# Patient Record
Sex: FEMALE | Race: WHITE | HISPANIC OR LATINO | URBAN - METROPOLITAN AREA
[De-identification: names, ages, dates, MRNs, and addresses within clinical notes are randomized per-mention and may not be internally consistent; named-entity substitution may affect disease eponyms.]

---

## 2022-07-12 DIAGNOSIS — Z34.01 ENCOUNTER FOR SUPERVISION OF NORMAL FIRST PREGNANCY IN FIRST TRIMESTER: Primary | ICD-10-CM

## 2022-07-22 ENCOUNTER — PROCEDURE VISIT (OUTPATIENT)
Dept: OBSTETRICS AND GYNECOLOGY | Facility: CLINIC | Age: 22
End: 2022-07-22
Payer: MEDICAID

## 2022-07-22 DIAGNOSIS — Z34.01 ENCOUNTER FOR SUPERVISION OF NORMAL FIRST PREGNANCY IN FIRST TRIMESTER: ICD-10-CM

## 2022-07-25 LAB
AMPHETAMINES (500): NEGATIVE NG/ML
BARBITURATES (200): NEGATIVE NG/ML
BENZODIAZEPINES: NEGATIVE NG/ML
COCAINE (150): NEGATIVE NG/ML
MARIJUANA, THC (50): NEGATIVE NG/ML
METHADONE: NEGATIVE NG/ML
OPIATES: NEGATIVE NG/ML
OXYCODONE: NEGATIVE NG/ML
PH: 7.4 (ref 4.5–8)
PHENCYCLIDINE (25): NEGATIVE NG/ML
URINE CREATININE D/S: 144.2 MG/DL

## 2022-08-01 ENCOUNTER — ROUTINE PRENATAL (OUTPATIENT)
Dept: OBSTETRICS AND GYNECOLOGY | Facility: CLINIC | Age: 22
End: 2022-08-01
Payer: MEDICAID

## 2022-08-01 VITALS — DIASTOLIC BLOOD PRESSURE: 68 MMHG | SYSTOLIC BLOOD PRESSURE: 108 MMHG | WEIGHT: 131 LBS | HEART RATE: 67 BPM

## 2022-08-01 DIAGNOSIS — Z34.01 ENCOUNTER FOR SUPERVISION OF NORMAL FIRST PREGNANCY IN FIRST TRIMESTER: Primary | ICD-10-CM

## 2022-08-01 PROCEDURE — 99203 OFFICE O/P NEW LOW 30 MIN: CPT | Mod: TH,S$GLB,, | Performed by: OBSTETRICS & GYNECOLOGY

## 2022-08-01 PROCEDURE — 99203 PR OFFICE/OUTPT VISIT, NEW, LEVL III, 30-44 MIN: ICD-10-PCS | Mod: TH,S$GLB,, | Performed by: OBSTETRICS & GYNECOLOGY

## 2022-08-01 NOTE — PROGRESS NOTES
Subjective:       Patient ID: Amalia Chang is a 21 y.o.  at 11w4d   Chief Complaint:  Initial Prenatal Visit      History of Present Illness  here for new ob exam.  Labs and history were reviewed with the patient today  No complaints      No past medical history on file.    Past Surgical History:   Procedure Laterality Date    WRIST SURGERY         OB:    OB History    Para Term  AB Living   1             SAB IAB Ectopic Multiple Live Births                  # Outcome Date GA Lbr Geoffrey/2nd Weight Sex Delivery Anes PTL Lv   1 Current              Gyn: no STD, never had abn pap   Meds: No current outpatient medications on file.    All: Review of patient's allergies indicates:  No Known Allergies    SH:   Social History     Tobacco Use    Smoking status: Never Smoker    Smokeless tobacco: Never Used   Substance Use Topics    Alcohol use: Not Currently      FH: family history is not on file.      Review of Systems  nml 1st trimester sx- sob, dec excercixe tolerance, fatigue and nausea  Neg for vag bleed, dc, vomiting, cp, lof, fever, chills, ns, visual changes, swelling, headaches, constipation/diarrhea, dysuria, freq/urgency of urination     Objective:     Vitals:    22 1104   BP: 108/68   Pulse: 67   Weight: 59.4 kg (131 lb)       NAD  NCAT  pupils normal size  Skin nml no rashes or lesions  No resp distress, resp even and unlabored  nt nd, no rebound no guarding  nml ext fem gent, normal cvx uterus and adnexa, no dc or bleeding  nml appearing rectum  No cyanosis or clubbing, edema appropriate for pregn    Uterus size approp for gest age         Assessment:        1. Encounter for supervision of normal first pregnancy in first trimester               Plan:      Encounter for supervision of normal first pregnancy in first trimester  -     Urine culture  -     Liquid-based pap smear, screening           Pain fever bleeding precautions  Encouraged PNV  rtc 4 wks

## 2022-08-02 LAB
ABS NRBC COUNT: 0 X 10 3/UL (ref 0–0.01)
ABSOLUTE BASOPHIL: 0.02 X 10 3/UL (ref 0–0.22)
ABSOLUTE EOSINOPHIL: 0.09 X 10 3/UL (ref 0.04–0.54)
ABSOLUTE IMMATURE GRAN: 0.04 X 10 3/UL (ref 0–0.04)
ABSOLUTE LYMPHOCYTE: 1.75 X 10 3/UL (ref 0.86–4.75)
ABSOLUTE MONOCYTE: 0.35 X 10 3/UL (ref 0.22–1.08)
ANTIBODY SCREEN: NEGATIVE
BASOPHILS NFR BLD: 0.3 % (ref 0.2–1.2)
BLOOD GROUPING: NORMAL
BLOOD TYPE (D): POSITIVE
EOSINOPHIL NFR BLD: 1.2 % (ref 0.7–7)
HBV SURFACE AG SERPL QL IA: NONREACTIVE
HCT VFR BLD AUTO: 35.3 % (ref 37–47)
HCV IGG SERPL QL IA: NONREACTIVE
HGB BLD-MCNC: 11.7 G/DL (ref 12–16)
HIV 1+2 AB+HIV1 P24 AG SERPL QL IA: NONREACTIVE
IMMATURE GRANULOCYTES: 0.5 % (ref 0–0.5)
LYMPHOCYTES NFR BLD: 22.5 % (ref 19.3–53.1)
MCH RBC QN AUTO: 30.2 PG (ref 27–32)
MCHC RBC AUTO-ENTMCNC: 33.1 G/DL (ref 32–36)
MCV RBC AUTO: 91 FL (ref 82–100)
MONOCYTES NFR BLD: 4.5 % (ref 4.7–12.5)
NEUTROPHILS # BLD AUTO: 5.54 X 10 3/UL (ref 2.15–7.56)
NEUTROPHILS NFR BLD: 71 % (ref 34–71.1)
NUCLEATED RED BLOOD CELLS: 0 /100 WBC (ref 0–0.2)
PLATELET # BLD AUTO: 241 X 10 3/UL (ref 135–400)
RBC # BLD AUTO: 3.88 X 10 6/UL (ref 4.2–5.4)
RDW-SD: 43.2 FL (ref 37–54)
RUBELLA IGG SCREEN: NORMAL
SICKLE CELL PREP: NEGATIVE
SYPHILIS TREPONEMAL ANTIBODY: NONREACTIVE
WBC # BLD: 7.79 X 10 3/UL (ref 4.3–10.8)

## 2022-08-03 LAB
CHLAMYDIA: NEGATIVE
GONORRHEA: NEGATIVE
Lab: NORMAL
SOURCE: NORMAL
SOURCE: NORMAL
TRICHOMONAS AMPLIFIED: NEGATIVE
URINE CULTURE, ROUTINE: NORMAL

## 2022-08-09 ENCOUNTER — PATIENT MESSAGE (OUTPATIENT)
Dept: OBSTETRICS AND GYNECOLOGY | Facility: CLINIC | Age: 22
End: 2022-08-09
Payer: MEDICAID

## 2022-08-09 ENCOUNTER — NURSE TRIAGE (OUTPATIENT)
Dept: ADMINISTRATIVE | Facility: CLINIC | Age: 22
End: 2022-08-09
Payer: MEDICAID

## 2022-08-09 NOTE — TELEPHONE ENCOUNTER
Called patient, she reports cramping and diarrhea. Denies vaginal bleeding. Patient states she is on the way to the ER. Informed patient if she feels she needs to be evaluated she came continue to go. Informed patient to stay hydrated and take tylenol if needed for the cramping. Verbalized understanding.

## 2022-08-09 NOTE — TELEPHONE ENCOUNTER
Patient transferred from patient access. C/O lower abdominal pain with pain to vagina also. She is 12 weeks and 5 days gestation. Unclear level of pain, states more painful than normal. Rates pain scale of 5/10. Triage done- dispo ED. She has someone that can bring her. Advised I would send message to her provider. Instructed to call back for any further questions or concerns. Verb understanding.      Reason for Disposition   MODERATE-SEVERE abdominal pain    Additional Information   Negative: Passed out (i.e., fainted, collapsed and was not responding)   Negative: Shock suspected (e.g., cold/pale/clammy skin, too weak to stand, low BP, rapid pulse)   Negative: Difficult to awaken or acting confused (e.g., disoriented, slurred speech)   Negative: Sounds like a life-threatening emergency to the triager   Negative: SEVERE vaginal bleeding (e.g., soaking 2 pads per hour, large blood clots) and present 2 or more hours   Negative: MODERATE vaginal bleeding (e.g., soaking 1 pad per hour; clots) and present > 6 hours   Negative: MODERATE vaginal bleeding and pregnant > 12 weeks   Negative: Passed tissue (e.g., gray-white)   Negative: Vomiting and contains red blood or black ('coffee ground') material   Negative: Shoulder pain   Negative: MILD constant abdominal pain (e.g., doesn't interfere with normal activities) and present > 2 hours   Negative: Intermittent lower abdominal pain lasting > 24 hours   Negative: Vaginal bleeding or spotting   Negative: White of the eyes have turned yellow (i.e., jaundice)   Negative: Lightheadedness or dizziness   Negative: Patient sounds very sick or weak to the triager   Negative: Fever > 100.4 F (38.0 C)   Negative: Unusual vaginal discharge (e.g., odorous, yellow, green, or foamy-white)   Negative: Discomfort when passing urine (e.g., pain, burning or stinging)   Negative: Blood in urine (red, pink, or tea-colored)   Negative: Patient wants to be seen   Negative:  Prior history of 'ectopic pregnancy' or previous tubal surgery (e.g., tubal ligation)   Negative: Has IUD   Negative: Not feeling pregnant any longer (e.g., breast tenderness or nausea has disappeared)   Negative: Abdominal pain and pregnant 20 or more weeks    Protocols used: PREGNANCY - ABDOMINAL PAIN LESS THAN 20 WEEKS EGA-A-[place]

## 2022-08-29 ENCOUNTER — ROUTINE PRENATAL (OUTPATIENT)
Dept: OBSTETRICS AND GYNECOLOGY | Facility: CLINIC | Age: 22
End: 2022-08-29
Payer: MEDICAID

## 2022-08-29 VITALS — HEART RATE: 83 BPM | WEIGHT: 133 LBS | DIASTOLIC BLOOD PRESSURE: 72 MMHG | SYSTOLIC BLOOD PRESSURE: 112 MMHG

## 2022-08-29 DIAGNOSIS — Z34.02 ENCOUNTER FOR SUPERVISION OF NORMAL FIRST PREGNANCY IN SECOND TRIMESTER: Primary | ICD-10-CM

## 2022-08-29 PROCEDURE — 99213 PR OFFICE/OUTPT VISIT, EST, LEVL III, 20-29 MIN: ICD-10-PCS | Mod: TH,S$GLB,, | Performed by: OBSTETRICS & GYNECOLOGY

## 2022-08-29 PROCEDURE — 99213 OFFICE O/P EST LOW 20 MIN: CPT | Mod: TH,S$GLB,, | Performed by: OBSTETRICS & GYNECOLOGY

## 2022-08-29 NOTE — PROGRESS NOTES
Subjective:       Patient ID: Amalia Chang is a 21 y.o.  at 15w4d      Chief Complaint:  No chief complaint on file.      History of Present Illness  No complaints. Reports normal sx. Labs and history reviewed with pt.       Review of Systems  Denies n/v, f/c, dysuria, contractions,   VD, VB, round ligament pain, headaches       Objective:   There were no vitals filed for this visit.  Wt Readings from Last 3 Encounters:   22 59.4 kg (131 lb)        nad  NCAT  pupils normal size  Skin nml no rashes or lesions  No resp distress, resp even and unlabored  Gravid nt, no rebound no guarding  No cyanosis or clubbing, edema appropriate for pregn    FHT: 150's       Assessment:        1. Encounter for supervision of normal first pregnancy in second trimester                Plan:        Prenatal testing -   Encouraged PNV  Pain, fever, bleeding precautions   RTC 4 weeks

## 2022-09-20 ENCOUNTER — NURSE TRIAGE (OUTPATIENT)
Dept: ADMINISTRATIVE | Facility: CLINIC | Age: 22
End: 2022-09-20
Payer: MEDICAID

## 2022-09-20 ENCOUNTER — PATIENT MESSAGE (OUTPATIENT)
Dept: OBSTETRICS AND GYNECOLOGY | Facility: CLINIC | Age: 22
End: 2022-09-20
Payer: MEDICAID

## 2022-09-20 NOTE — TELEPHONE ENCOUNTER
Having CP past 3-4 hours, feels like weighted, pressure like pain 4/10 and has been constant. Also 19 weeks pregnant. Denies abd pain, vaginal bleeding or leakage of fluid. Advised per protocol. VU  Reason for Disposition   [1] Chest pain lasts > 5 minutes AND [2] described as crushing, pressure-like, or heavy    Additional Information   Negative: SEVERE difficulty breathing (e.g., struggling for each breath, speaks in single words)   Negative: Difficult to awaken or acting confused (e.g., disoriented, slurred speech)   Negative: Shock suspected (e.g., cold/pale/clammy skin, too weak to stand, low BP, rapid pulse)   Negative: Passed out (i.e., fainted, collapsed and was not responding)   Negative: [1] Chest pain lasts > 5 minutes AND [2] age > 44   Negative: [1] Chest pain lasts > 5 minutes AND [2] age > 30 AND [3] one or more cardiac risk factors (e.g., diabetes, high blood pressure, high cholesterol, smoker, or strong family history of heart disease)   Negative: [1] Chest pain lasts > 5 minutes AND [2] history of heart disease (i.e., angina, heart attack, heart failure, bypass surgery, takes nitroglycerin)    Protocols used: Chest Pain-A-

## 2022-09-26 ENCOUNTER — ROUTINE PRENATAL (OUTPATIENT)
Dept: OBSTETRICS AND GYNECOLOGY | Facility: CLINIC | Age: 22
End: 2022-09-26
Payer: MEDICAID

## 2022-09-26 VITALS — HEART RATE: 80 BPM | SYSTOLIC BLOOD PRESSURE: 108 MMHG | WEIGHT: 138 LBS | DIASTOLIC BLOOD PRESSURE: 72 MMHG

## 2022-09-26 DIAGNOSIS — Z34.02 ENCOUNTER FOR SUPERVISION OF NORMAL FIRST PREGNANCY IN SECOND TRIMESTER: Primary | ICD-10-CM

## 2022-09-26 PROCEDURE — 99213 OFFICE O/P EST LOW 20 MIN: CPT | Mod: TH,S$GLB,, | Performed by: OBSTETRICS & GYNECOLOGY

## 2022-09-26 PROCEDURE — 99213 PR OFFICE/OUTPT VISIT, EST, LEVL III, 20-29 MIN: ICD-10-PCS | Mod: TH,S$GLB,, | Performed by: OBSTETRICS & GYNECOLOGY

## 2022-09-26 NOTE — PROGRESS NOTES
Subjective:       Patient ID: Amalia Chang is a 21 y.o.  at 19w4d      Chief Complaint:  Routine Prenatal Visit      History of Present Illness  No complaints. Reports normal sx. Labs and history reviewed with pt.       Review of Systems  Denies n/v, f/c, dysuria, contractions,   VD, VB, round ligament pain, headaches       Objective:     Vitals:    22 1322   BP: 108/72   Pulse: 80     Wt Readings from Last 3 Encounters:   22 62.6 kg (138 lb)   22 60.3 kg (133 lb)   22 59.4 kg (131 lb)        nad  NCAT  pupils normal size  Skin nml no rashes or lesions  No resp distress, resp even and unlabored  Gravid nt, no rebound no guarding  No cyanosis or clubbing, edema appropriate for pregn    FHT: 150's       Assessment:        1. Encounter for supervision of normal first pregnancy in second trimester                Plan:        Prenatal testing -   Anatomy scan   Encouraged PNV  Pain, fever, bleeding precautions   RTC 4 weeks

## 2022-09-29 LAB
AFP MOM: 0.94
AFP, SERUM: 55 NG/ML
AGE RISK DOWN SYNDROME: NORMAL
CALCULATED GESTATIONAL AGE: NORMAL
CIGARETTE SMOKER: NO
COLLECTION DATE: NORMAL
COMMENT: 19.6 WEEKS
DATE OF BIRTH: NORMAL
DONOR AGE: EGG RETRIEVAL: NORMAL
DONOR EGG: NO
EDD DETERMINED BY: NORMAL
EST'D DATE OF DELIVERY: NORMAL
ESTRIOL MOM: 1.03
ESTRIOL, FREE: 2.07 NG/ML
HCG MOM: 1.59
HCG, SERUM: 33.9 IU/ML
HX OF NEURAL TUBE DEFECTS: NO
INHIBIN A MOM: 1.13
INHIBIN A, DIMERIC: 189 PG/ML
INSULIN DEPEND DIABETIC: NO
INTERPRETATION: NORMAL
Lab: NORMAL
MATERNAL WEIGHT: 138 LBS
MOTHER'S ETHNIC ORIGIN: NORMAL
MSS DOWN SYNDROME RISK: NORMAL
MSS TRISOMY 18 RISK: NORMAL
NUMBER OF FETUSES: 1
OTHER ETHNIC INFORMATION: NORMAL
PREGNANCY RESULT OF IVF: NO
PREV PREGNANCY DOWN SYND: NO
REPEAT SPECIMEN: NO
RISK FOR ONTD: NORMAL

## 2022-10-10 ENCOUNTER — ROUTINE PRENATAL (OUTPATIENT)
Dept: OBSTETRICS AND GYNECOLOGY | Facility: CLINIC | Age: 22
End: 2022-10-10
Payer: MEDICAID

## 2022-10-10 VITALS — WEIGHT: 139 LBS | SYSTOLIC BLOOD PRESSURE: 112 MMHG | DIASTOLIC BLOOD PRESSURE: 71 MMHG | HEART RATE: 97 BPM

## 2022-10-10 DIAGNOSIS — Z34.02 ENCOUNTER FOR SUPERVISION OF NORMAL FIRST PREGNANCY IN SECOND TRIMESTER: Primary | ICD-10-CM

## 2022-10-10 PROCEDURE — 76805 US OB/GYN PROCEDURE (VIEWPOINT): ICD-10-PCS | Mod: S$GLB,,, | Performed by: OBSTETRICS & GYNECOLOGY

## 2022-10-10 PROCEDURE — 99213 OFFICE O/P EST LOW 20 MIN: CPT | Mod: 25,TH,S$GLB, | Performed by: OBSTETRICS & GYNECOLOGY

## 2022-10-10 PROCEDURE — 99213 PR OFFICE/OUTPT VISIT, EST, LEVL III, 20-29 MIN: ICD-10-PCS | Mod: 25,TH,S$GLB, | Performed by: OBSTETRICS & GYNECOLOGY

## 2022-10-10 PROCEDURE — 76805 OB US >/= 14 WKS SNGL FETUS: CPT | Mod: S$GLB,,, | Performed by: OBSTETRICS & GYNECOLOGY

## 2022-10-10 NOTE — PROGRESS NOTES
Subjective:       Patient ID: Amalia Chang is a 21 y.o.  at 21w4d      Chief Complaint:  Routine Prenatal Visit      History of Present Illness  No complaints. Reports normal sx. Labs and history reviewed with pt.       Review of Systems  Denies n/v, f/c, dysuria, contractions,   VD, VB, round ligament pain, headaches       Objective:     Vitals:    10/10/22 1308   BP: 112/71   Pulse: 97     Wt Readings from Last 3 Encounters:   10/10/22 63 kg (139 lb)   22 62.6 kg (138 lb)   22 60.3 kg (133 lb)        nad  NCAT  pupils normal size  Skin nml no rashes or lesions  No resp distress, resp even and unlabored  Gravid nt, no rebound no guarding  No cyanosis or clubbing, edema appropriate for pregn    FHT: 150's       Assessment:        1. Encounter for supervision of normal first pregnancy in second trimester                Plan:     Encouraged PNV  Pain, fever, bleeding precautions   RTC 3 weeks

## 2023-01-12 ENCOUNTER — PATIENT MESSAGE (OUTPATIENT)
Dept: OTHER | Facility: OTHER | Age: 23
End: 2023-01-12
Payer: MEDICAID